# Patient Record
Sex: FEMALE | Race: WHITE | HISPANIC OR LATINO | Employment: FULL TIME | URBAN - METROPOLITAN AREA
[De-identification: names, ages, dates, MRNs, and addresses within clinical notes are randomized per-mention and may not be internally consistent; named-entity substitution may affect disease eponyms.]

---

## 2023-01-01 ENCOUNTER — HOSPITAL ENCOUNTER (EMERGENCY)
Facility: HOSPITAL | Age: 33
Discharge: HOME | End: 2023-01-01
Attending: EMERGENCY MEDICINE | Admitting: EMERGENCY MEDICINE
Payer: COMMERCIAL

## 2023-01-01 ENCOUNTER — APPOINTMENT (OUTPATIENT)
Dept: RADIOLOGY | Facility: HOSPITAL | Age: 33
End: 2023-01-01
Attending: EMERGENCY MEDICINE
Payer: COMMERCIAL

## 2023-01-01 VITALS
RESPIRATION RATE: 16 BRPM | DIASTOLIC BLOOD PRESSURE: 82 MMHG | OXYGEN SATURATION: 96 % | TEMPERATURE: 98.2 F | HEIGHT: 67 IN | SYSTOLIC BLOOD PRESSURE: 177 MMHG | BODY MASS INDEX: 36.1 KG/M2 | HEART RATE: 110 BPM | WEIGHT: 230 LBS

## 2023-01-01 DIAGNOSIS — S89.302A: ICD-10-CM

## 2023-01-01 PROCEDURE — 73610 X-RAY EXAM OF ANKLE: CPT | Mod: LT

## 2023-01-01 PROCEDURE — 27786 TREATMENT OF ANKLE FRACTURE: CPT | Mod: LT

## 2023-01-01 PROCEDURE — 99284 EMERGENCY DEPT VISIT MOD MDM: CPT | Mod: 25

## 2023-01-01 RX ORDER — OXYCODONE HYDROCHLORIDE 5 MG/1
5 TABLET ORAL EVERY 6 HOURS PRN
Qty: 12 TABLET | Refills: 0 | Status: SHIPPED | OUTPATIENT
Start: 2023-01-01

## 2023-01-01 NOTE — DISCHARGE INSTRUCTIONS
Please follow-up with [facility] this week; call Monday (if closed, call Tuesday) morning to arrange appointment.    Return to the ER for worsening symptoms, worsening pain, weakness / numbness / color changes to the leg, fever or other concerns.    Wear the ACE wrap for gentle compression and the walking boot until you see the orthopedist. It is important that you not bear any weight on the left leg until further advised by the orthopedist.    Apply ice and elevate your leg to help minimize pain and swelling.     Do not drink alcohol or drive while using the oxycodone.

## 2023-01-01 NOTE — ED TRIAGE NOTES
Pt had a trip and fall last night-injuring her left leg--states it hurts in the lower leg into the ankle--comes in wearing a walking boot she had from a previous surgery years ago.  [place] any other injury. No LOC

## 2023-01-01 NOTE — ED PROVIDER NOTES
"  Emergency Department Encounter     Evaluation Date & Time:   2023 11:26 AM    CHIEF COMPLAINT:  Leg Injury      Triage Note:Pt had a trip and fall last night-injuring her left leg--states it hurts in the lower leg into the ankle--comes in wearing a walking boot she had from a previous surgery years ago.  [place] any other injury. No LOC            Impression and Plan       FINAL IMPRESSION:    ICD-10-CM    1. Displaced physeal fracture of distal end of left fibula, initial encounter  S89.302A             ED COURSE & MEDICAL DECISION MAKIN:30 AM I met with the patient to gather history and to perform my initial exam. We discussed plans for the ED course, including diagnostic testing and treatment.   12:30 PM Rechecked and updated patient. I discussed plans for discharge with the patient, which they were agreeable to. We discussed supportive cares at home and reasons for return to the ER including new or worsening symptoms. All questions and concerns were addressed. Patient to be discharged by RN.       32 year old female, history of obesity, who presents for evaluation of left lower / left ankle pain after a mechanical fall sustained last night when she slipped on a wet floor. She heard a \"snap\" when she fell and has been hearing a clicking sound when walking. Pain is worse with weight-bearing. She braced her fall with both hands / wrists, but denies wrist / hand pain.    On exam, there is mild to moderate swelling about the left ankle (medial > lateral). There is tenderness to palpation over the medial aspect of the left ankle. No tenderness to palpation over the lateral aspect of the ankle, the metatarsals, the calcaneus, the proximal fibula or the knee. ROM decreased, but CMS intact.    X-rays left ankle performed and demonstrated oblique fracture of the distal fibular diaphysis with mild posterior displacement of the distal fibula. The ankle mortise is intact. The talar dome is unremarkable. No ankle " joint effusion. Talocalcaneal arthrodesis with screw fixation. Diffuse soft tissue swelling.    Ankle was ACE wrapped and patient placed back in her walking boot (has one from previous surgery). She also was fitted for crutches.    Nothing in history or on exam to suggest significant head, cervical spine, chest, abdominal, pelvis or back injury and I do not think imaging studies to evaluate for such are indicated.    I do not think admission is indicated and patient was discharged to home with follow-up with [facility] this upcoming week.  She was advised to avoid any weightbearing on the LLE until further instructed by the orthopedist.  RICE and return precautions provided.  She was given a prescription for oxycodone.  Patient stable throughout ED course.      At the conclusion of the encounter I discussed the results of all the tests and the disposition. The questions were answered. The patient acknowledged understanding and was agreeable with the care plan.      Medical Decision Making    Work Up:    Chart documentation includes differential considered and any EKGs or imaging independently interpreted by provider.  In additional to work up documented, I considered the following work up: see above documentation    Complicating factors:    Care impacted by chronic illness: Other: obesity    Care affected by social determinants of health: N/A    Disposition considerations: Discharge. I prescribed additional prescription strength medication(s) as charted. N/A.        MEDICATIONS GIVEN IN THE EMERGENCY DEPARTMENT:  Medications - No data to display    NEW PRESCRIPTIONS STARTED AT TODAY'S ED VISIT:  Discharge Medication List as of 1/1/2023 12:34 PM      START taking these medications    Details   oxyCODONE (ROXICODONE) 5 MG tablet Take 1 tablet (5 mg) by mouth every 6 hours as needed for moderate to severe pain, Disp-12 tablet, R-0, Local Print             HPI     HPI     Louisa Martinez is a 32 year old  "female, history of obesity, who presents to this ED via walk-in for evaluation of left leg pain after a fall.    Patient reports that she went out last night and as she was leaving to go home she slipped on the wet floor falling sideways. She braced her fall with both hands and fell onto her left leg. She had sudden onset LLE pain about the left ankle that has been constant and worse this morning. She also notes that she has been hearing a clicking sound and thought that she heard a \"snap\" when she fell. The pain is most prominent to the medial aspect of the left ankle and is worse with weight-bearing. She denies any pain to her hands / wrists.     She did not hit her head or sustain LOC. Denies headache, neck pain, chest pain, shortness of breath, abdominal pain or back pain.     Patient endorses a PMH of chronic bilateral feet pain and having surgery on both of her feet secondary to early arthritis.       REVIEW OF SYSTEMS:  All other systems reviewed and are negative.      Medical History     History reviewed. No pertinent past medical history.    History reviewed. No pertinent surgical history.    No family history on file.         oxyCODONE (ROXICODONE) 5 MG tablet        Physical Exam     First Vitals:  Patient Vitals for the past 24 hrs:   BP Temp Temp src Pulse Resp SpO2 Height Weight   01/01/23 1100 (!) 177/82 98.2  F (36.8  C) Oral 110 16 96 % 1.702 m (5' 7\") 104.3 kg (230 lb)       PHYSICAL EXAM:   Physical Exam    GENERAL: Awake, alert.  In mild acute distress.   HEENT: Normocephalic, atraumatic. Pupils equal, round and reactive. Conjunctiva normal.  NECK: No stridor.  PULMONARY: Symmetrical breath sounds without distress.  Lungs clear to auscultation bilaterally without wheezes, rhonchi or rales.  CARDIO: Tachycardic rate with regular rhythm.  No significant murmur, rub or gallop.  Radial pulses strong and symmetrical.  ABDOMINAL: Abdomen soft, non-distended and non-tender to palpation.   EXTREMITIES: " Mild to moderate swelling about the left ankle (medial > lateral). There is tenderness to palpation over the medial aspect of the left ankle. No tenderness to palpation over the lateral aspect of the ankle, the metatarsals, the calcaneus, the proximal fibula or the knee. The foot is warm and well perfused with strong pedal pulse. She is able to move all her toes. Sensation to light touch grossly intact.       NEURO: Alert and oriented to person, place and time.  Cranial nerves grossly intact.  No focal motor deficit.  PSYCH: Normal mood and affect.  SKIN: No lacerations.     Results     LAB:  All pertinent labs reviewed and interpreted  Labs Ordered and Resulted from Time of ED Arrival to Time of ED Departure - No data to display    RADIOLOGY:  XR Ankle Left G/E 3 Views   Final Result   IMPRESSION: Oblique fracture of the distal fibular diaphysis with mild posterior displacement of the distal fibula. The ankle mortise is intact. The talar dome is unremarkable. No ankle joint effusion. Talocalcaneal arthrodesis with screw fixation.    Diffuse soft tissue swelling.          I, Rafi Cervantes, am serving as a scribe to document services personally performed by Sheryl Bloom MD based on my observation and the provider's statements to me. I, Sheryl Bloom MD attest that Rafi Cervantes is acting in a scribe capacity, has observed my performance of the services and has documented them in accordance with my direction.    Sheryl Bloom MD  Emergency Medicine  [facility] EMERGENCY DEPARTMENT           Sheryl Bloom MD  01/02/23 0842

## 2023-04-09 ENCOUNTER — HEALTH MAINTENANCE LETTER (OUTPATIENT)
Age: 33
End: 2023-04-09

## 2024-06-15 ENCOUNTER — HEALTH MAINTENANCE LETTER (OUTPATIENT)
Age: 34
End: 2024-06-15

## 2025-06-21 ENCOUNTER — HEALTH MAINTENANCE LETTER (OUTPATIENT)
Age: 35
End: 2025-06-21